# Patient Record
Sex: MALE | Race: WHITE
[De-identification: names, ages, dates, MRNs, and addresses within clinical notes are randomized per-mention and may not be internally consistent; named-entity substitution may affect disease eponyms.]

---

## 2019-02-04 ENCOUNTER — HOSPITAL ENCOUNTER (OUTPATIENT)
Dept: HOSPITAL 41 - JD.ED | Age: 79
Discharge: HOME | End: 2019-02-04
Attending: SURGERY
Payer: MEDICARE

## 2019-02-04 DIAGNOSIS — K29.50: ICD-10-CM

## 2019-02-04 DIAGNOSIS — K29.80: ICD-10-CM

## 2019-02-04 DIAGNOSIS — K20.9: ICD-10-CM

## 2019-02-04 DIAGNOSIS — T18.128A: Primary | ICD-10-CM
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
## 2019-02-04 PROCEDURE — 71045 X-RAY EXAM CHEST 1 VIEW: CPT

## 2019-02-04 PROCEDURE — 43239 EGD BIOPSY SINGLE/MULTIPLE: CPT

## 2019-02-04 PROCEDURE — 96375 TX/PRO/DX INJ NEW DRUG ADDON: CPT

## 2019-02-04 PROCEDURE — 43247 EGD REMOVE FOREIGN BODY: CPT

## 2019-02-04 PROCEDURE — 99285 EMERGENCY DEPT VISIT HI MDM: CPT

## 2019-02-04 PROCEDURE — 96374 THER/PROPH/DIAG INJ IV PUSH: CPT

## 2019-02-04 NOTE — PCM.HP
H&P History of Present Illness





- General


Date of Service: 02/04/19


Admit Problem/Dx: 


 Admission Diagnosis/Problem





Admission Diagnosis/Problem      Foreign body in digestive tract








Source of Information: Patient, Provider


History Limitations: Reports: No Limitations





- History of Present Illness


Initial Comments - Free Text/Narative: 





The patient is a 78-year-old gentleman who presents from an outside facility.  

He reports having Salad and steak earlier today, after which he felt it became 

lodged in his esophagus.  He is having discomfort in the substernal area.  This 

occurs when he attempts to eat or drink anything.  When he tries to drink fluids

, he has regurgitation and coughing.  He was feeling fine before this episode 

occurred.  He has no previous episodes similar to this





- Related Data


Allergies/Adverse Reactions: 


 Allergies











Allergy/AdvReac Type Severity Reaction Status Date / Time


 


No Known Allergies Allergy   Verified 02/04/19 18:42











Home Medications: 


 Home Meds





Aspirin [Adult Low Dose Aspirin EC] 81 mg PO DAILY 02/04/19 [History]











Past Medical History


HEENT History: Reports: Cataract





- Past Surgical History


Other Musculoskeletal Surgeries/Procedures:: archilles tendon repair





Social & Family History





- Family History


Oncologic: Reports: Colon, Leukemia





- Tobacco Use


Smoking Status *Q: Never Smoker





- Caffeine Use


Caffeine Use: Reports: Tea





- Recreational Drug Use


Recreational Drug Use: No





H&P Review of Systems





- Review of Systems:


Review Of Systems: See Below


General: Reports: No Symptoms


HEENT: Reports: No Symptoms


Pulmonary: Reports: No Symptoms


Cardiovascular: Reports: No Symptoms


Gastrointestinal: Reports: Other (see HPI)


Genitourinary: Reports: No Symptoms


Musculoskeletal: Reports: Foot Pain


Skin: Reports: No Symptoms


Neurological: Reports: No Symptoms





Exam





- Exam


Exam: See Below





- Vital Signs


Vital Signs: 


 Last Vital Signs











Temp  36.9 C   02/04/19 18:42


 


Pulse  73   02/04/19 18:42


 


Resp  12   02/04/19 18:42


 


BP  165/87 H  02/04/19 18:42


 


Pulse Ox  100   02/04/19 18:42











Weight: 84.368 kg





- Exam


Quality Assessment: No: Supplemental Oxygen


General: Alert, Oriented


HEENT: Conjunctiva Clear, EOMI


Neck: Supple


Lungs: Normal Respiratory Effort


GI/Abdominal Exam: Soft, Non-Tender, No Distention


Extremities: Normal Inspection


Neurological: Cranial Nerves Intact


Neuro Extensive - Mental Status: Alert, Oriented x3, Normal Mood/Affect





*Q Meaningful Use (ADM)





- VTE Risk Assess *Q


Each Risk Factor Represents 1 Point: Minor Surgery Planned


Total Score 1 Point Risk Factors: 1


Each Risk Factor Represents 3 Points: Age 75 Years or Greater


Total Score 3 Point Risk Factors: 3


Problem List Initiated/Reviewed/Updated: Yes


Orders Last 24hrs: 


 Active Orders 24 hr











 Category Date Time Status


 


 Patient Status [ADT] Routine ADT  02/04/19 20:24 Active


 


 Peripheral IV Care [RC] .AS DIRECTED Care  02/04/19 19:10 Active


 


 Sodium Chloride 0.9% [Saline Flush] Med  02/04/19 19:10 Active





 10 ml FLUSH ASDIRECTED PRN   


 


 Peripheral IV Insertion Adult [OM.PC] Routine Oth  02/04/19 19:10 Ordered


 


 Schedule Procedure [COMM] Stat Oth  02/04/19 20:23 Ordered








 Medication Orders





Sodium Chloride (Saline Flush)  10 ml FLUSH ASDIRECTED PRN


   PRN Reason: Keep Vein Open


   Last Admin: 02/04/19 19:20  Dose: 10 ml








Assessment/Plan Comment:: 





79 y/o gentleman with foreign body in the esophagus


- plan for EGD with intervention to retrieve foreign body. Risk of perforation 

discussed, written consent obtained.


- Based on findings from the procedure, we will decide patient needs further 

workup


- NPO





Alma Nino MD


General Surgery

## 2019-02-04 NOTE — PCM.PRNOTE
- Free Text/Narrative


Note: 





Operative Report





Date of procedure: February 4, 2019





Preoperative diagnosis: Esophageal foreign body 


Postoperative diagnosis: Esophageal foreign body with gastritis and duodenitis





Surgeon: Alma Nino M.D.


Procedure: EGD with biopsy and retrieval foreign body





Anesthesia: General ET





Anesthesiologist: Marlon Corea CRNA 





IV fluids: 1000 mL





Estimated blood loss: 0 mL





Specimens: 


1.  Duodenum


2.  Gastric antrum for H. pylori





Indication: The patient is a 78 -year-old gentleman who presented with symptoms 

of a foreign body in his esophagus after eating earlier today.  The patient was 

consented for an EGD with intervention.  Risk of bleeding and perforation were 

discussed.  The patient's consent was obtained





Description of the procedure:  The patient was taken to the endoscopy suite and 

placed on hemodynamic monitoring.  The nurse anesthetist induced general 

anesthesia and intubated the patient. A bite block was placed. The patient was 

positioned in the left lateral decubitus position. A timeout was performed.





The endoscope was gently placed into the mouth to the back of the pharynx and 

introduced into the esophagus. The scope was gently advanced under direct 

visualization down where we visualized a foreign body.  This was consistent 

with the patient's story that he had ingested piece of meat.  A hexagonal snare 

was used to grasp the median withdrawn.  The mouth.  We then reinserted the 

scope and attempted to push the remaining food bolus into the stomach.  It was 

at this time, we noticed a small mucosal rent.  We then proceeded to grasp the 

second food piece with the hexagonal snare, and it was withdrawn.  This was an 

approximately 4 x 0.5 cm piece of meat.  The scope was then reinserted into the 

stomach.  It was a large amount of fluid in stomach, which was suctioned.  

There was linear gastritis consistent with mild erosion and erythema.  The 

gastric antrum was biopsied using cold biopsy forceps for H. pylori.  The 

duodenum was entered and there was diffuse to adenopathy with erythema and mild 

erosions.  There was no jose david ulceration.  A biopsy was taken of the duodenal 

mucosa also.  Again inspected the stomach and did not note any residual 

abnormality.  The scope was withdrawn and there was esophagitis noted at the GE 

junction.  The area of previous mucosal injury was hemostatic without signs of 

deep injury. A final piece of foreign body was grasped withdrawn.  The scope 

was reinserted one final time to inspect the area.  There was no residual 

foreign body.  There was noted some esophagitis with petechiae in the mid to 

upper esophagus.  





The procedure was terminated. the patient tolerated the procedure well without 

any evidence of complications.





Instructions:


 The patient will be instructed to maintain a full liquid diet for the next 48 

hours.  After that time, he may resume regular diet with adequate chewing.  

Will follow up in my office in 2 weeks





Alma Nino MD


General Surgery

## 2019-02-04 NOTE — PCM.PREANE
Preanesthetic Assessment





- Anesthesia/Transfusion/Family Hx


Anesthesia History: Prior Anesthesia Without Reaction


Family History of Anesthesia Reaction: No


Transfusion History: No Prior Transfusion(s)





- Review of Systems


General: Malaise


Pulmonary: No Symptoms


Cardiovascular: No Symptoms


Gastrointestinal: Abdominal Pain


Neurological: No Symptoms


Other: Reports: Throat Pain





- Physical Assessment


NPO Status Date: 02/04/19


NPO Status Time: 13:00


Pulse: 73


O2 Sat by Pulse Oximetry: 100


Respiratory Rate: 12


Blood Pressure: 165/87


Temperature: 36.9 C


Vital Signs: 





 Last Vital Signs











Temp  36.9 C   02/04/19 18:42


 


Pulse  73   02/04/19 18:42


 


Resp  12   02/04/19 18:42


 


BP  165/87 H  02/04/19 18:42


 


Pulse Ox  100   02/04/19 18:42











Height: 1.73 m


Weight: 84.368 kg


ASA Class: 2


Mental Status: Alert & Oriented x3


Airway Class: Mallampati = 2


Dentition: Reports: Partial (top), Missing Tooth/Teeth


Thyro-Mental Finger Breadths: 3


Mouth Opening Finger Breadths: 2


ROM/Head Extension: Full


Lungs: Clear to Auscultation, Normal Respiratory Effort


Cardiovascular: Regular Rate, Regular Rhythm





- Allergies


Allergies/Adverse Reactions: 


 Allergies











Allergy/AdvReac Type Severity Reaction Status Date / Time


 


No Known Allergies Allergy   Verified 02/04/19 18:42














- Blood


Blood Available: No


Product(s) Available: None





- Anesthesia Plan


Pre-Op Medication Ordered: None





- Acknowledgements


Anesthesia Type Planned: General Anesthesia


Pt an Appropriate Candidate for the Planned Anesthesia: Yes


Alternatives and Risks of Anesthesia Discussed w Pt/Guardian: Yes


Pt/Guardian Understands and Agrees with Anesthesia Plan: Yes





PreAnesthesia Questionnaire


HEENT History: Reports: Cataract





- Past Surgical History


Other Musculoskeletal Surgeries/Procedures:: archilles tendon repair





- SUBSTANCE USE


Smoking Status *Q: Never Smoker


Tobacco Use Within Last Twelve Months: No


Second Hand Smoke Exposure: No


Days Per Week of Alcohol Use: 1


Number of Drinks Per Day: 0


Total Drinks Per Week: 0


Recreational Drug Use History: No





- HOME MEDS


Home Medications: 


 Home Meds





Aspirin [Adult Low Dose Aspirin EC] 81 mg PO DAILY 02/04/19 [History]











- CURRENT (IN HOUSE) MEDS


Current Meds: 





 Current Medications





Sodium Chloride (Saline Flush)  10 ml FLUSH ASDIRECTED PRN


   PRN Reason: Keep Vein Open


   Last Admin: 02/04/19 19:20 Dose:  10 ml





Discontinued Medications





Glucagon (Glucagen)  1 mg IVPUSH ONETIME ONE


   Stop: 02/04/19 19:11


   Last Admin: 02/04/19 19:20 Dose:  1 mg


Lorazepam (Ativan)  1 mg IVPUSH ONETIME ONE


   Stop: 02/04/19 19:11


   Last Admin: 02/04/19 19:20 Dose:  1 mg

## 2019-02-04 NOTE — PCM.OPNOTE
- General Post-Op/Procedure Note


Date of Surgery/Procedure: 02/04/19


Operative Procedure(s): EGD with biopsy and foreign body retrieval


Findings: 





1.  Esophagitis


2.  Gastritis


3.  Duodenopathy


4.  Esophageal foreign body





Pre Op Diagnosis: Esophageal foreign body


Post-Op Diagnosis: Esophageal foreign body with gastritis and duodenopathy


Anesthesia Technique: General ET Tube


Primary Surgeon: Alma Nino


Anesthesia Provider: Marlon Corea


Pathology: 





1. Duodenum


2. Gastric antrum


Fluid Replacement, Intraop: 1,000


Output, Urine Amount: 0


EBL in mLs: 0


Complications: none apparent


Condition: Good

## 2019-02-04 NOTE — EDM.PDOC
ED HPI GENERAL MEDICAL PROBLEM





- General


Chief Complaint: Gastrointestinal Problem


Stated Complaint: SOMETHING INT THROAT


Time Seen by Provider: 02/04/19 18:45


Source of Information: Reports: Patient


History Limitations: Reports: No Limitations





- History of Present Illness


INITIAL COMMENTS - FREE TEXT/NARRATIVE: 





78-year-old male sent from [facility] for evaluation and treatment of 

impacted food bolus. Reportedly the patient had some steak around 1300 today. 

Since then he has been experiencing a sensation that he describes as something 

being stuck in his throat. He has been unable to swallow secretions. Was seen 

at the [facility] and referred to us. Surgeon was made aware. Asked that 

he be seen in the ER for IV Ativan and glucagon.





Upon my inspection the patient is not spitting up any secretions. he states 

that the sensation is improved. He denies any chest pain or trouble breathing 

at this point. Reports since leaving [place] about an hour and a half ago the 

symptoms have improved. 





Patient reports he has never had anything like this before. 





He has never had an EGD before. 


Onset: Today, Sudden





- Related Data


 Allergies











Allergy/AdvReac Type Severity Reaction Status Date / Time


 


No Known Allergies Allergy   Verified 02/04/19 18:42











Home Meds: 


 Home Meds





Aspirin [Adult Low Dose Aspirin EC] 81 mg PO DAILY 02/04/19 [History]











Past Medical History


HEENT History: Reports: Cataract





- Past Surgical History


Other Musculoskeletal Surgeries/Procedures:: archilles tendon repair





Social & Family History





- Tobacco Use


Smoking Status *Q: Never Smoker





- Caffeine Use


Caffeine Use: Reports: Tea





- Recreational Drug Use


Recreational Drug Use: No





ED ROS GENERAL





- Review of Systems


Review Of Systems: See Below


Respiratory: Denies: Shortness of Breath


Cardiovascular: Denies: Chest Pain


GI/Abdominal: Reports: Other (earlier was unable to swallow secretions, now 

improved)





ED EXAM, GI/ABD





- Physical Exam


Exam: See Below


Exam Limited By: No Limitations


General Appearance: Alert, WD/WN, No Apparent Distress


Neck: Normal Inspection


Respiratory/Chest: No Respiratory Distress, Lungs Clear, Normal Breath Sounds


Cardiovascular: Normal Peripheral Pulses, Regular Rate, Rhythm, No Murmur


GI/Abdominal Exam: Soft, Non-Tender


Neurological: Alert, Oriented, Normal Cognition


Psychiatric: Normal Affect, Normal Mood


Skin Exam: Warm, Dry, Normal Color





Course





- Vital Signs


Last Recorded V/S: 


 Last Vital Signs











Temp  99.0 F   02/04/19 22:20


 


Pulse  73   02/04/19 21:56


 


Resp  18   02/04/19 22:20


 


BP  142/89 H  02/04/19 22:20


 


Pulse Ox  94 L  02/04/19 22:20














- Orders/Labs/Meds


Orders: 


 Active Orders 24 hr











 Category Date Time Status


 


 Patient Status [ADT] Routine ADT  02/04/19 20:24 Active


 


 Communication Order [RC] ROUTINE Care  02/04/19 21:55 Active


 


 Cooling Warming Measures [RC] ASDIRECTED Care  02/04/19 21:55 Active


 


 Notify Provider [RC] ASDIRECTED Care  02/04/19 21:55 Active


 


 Oxygen Therapy [RC] ASDIRECTED Care  02/04/19 21:55 Active


 


 Peripheral IV Care [RC] .AS DIRECTED Care  02/04/19 19:10 Active


 


 Pulse Oximetry [RC] ASDIRECTED Care  02/04/19 21:55 Active


 


 Ready for Discharge [RC] PER UNIT ROUTINE Care  02/04/19 21:57 Active


 


 Vital Signs [RC] Q15M Care  02/04/19 21:55 Active


 


 Sodium Chloride 0.9% [Saline Flush] Med  02/04/19 19:10 Active





 10 ml FLUSH ASDIRECTED PRN   


 


 fentaNYL [Sublimaze] Med  02/04/19 21:55 Active





 50 mcg IVPUSH Q5M PRN   


 


 Peripheral IV Insertion Adult [OM.PC] Routine Oth  02/04/19 19:10 Ordered


 


 Schedule Procedure [COMM] Stat Oth  02/04/19 20:23 Ordered








 Medication Orders





Fentanyl (Sublimaze)  50 mcg IVPUSH Q5M PRN


   PRN Reason: Pain


Sodium Chloride (Saline Flush)  10 ml FLUSH ASDIRECTED PRN


   PRN Reason: Keep Vein Open


   Last Admin: 02/04/19 19:20  Dose: 10 ml








Meds: 


Medications











Generic Name Dose Route Start Last Admin





  Trade Name Freq  PRN Reason Stop Dose Admin


 


Fentanyl  50 mcg  02/04/19 21:55  





  Sublimaze  IVPUSH   





  Q5M PRN   





  Pain   





     





     





     


 


Sodium Chloride  10 ml  02/04/19 19:10  02/04/19 19:20





  Saline Flush  FLUSH   10 ml





  ASDIRECTED PRN   Administration





  Keep Vein Open   





     





     





     














Discontinued Medications














Generic Name Dose Route Start Last Admin





  Trade Name Freq  PRN Reason Stop Dose Admin


 


Fentanyl  Confirm  02/04/19 20:42  





  Sublimaze  Administered  02/04/19 20:43  





  Dose   





  100 mcg   





  .ROUTE   





  .STK-MED ONE   





     





     





     





     


 


Glucagon  1 mg  02/04/19 19:10  02/04/19 19:20





  Glucagen  IVPUSH  02/04/19 19:11  1 mg





  ONETIME ONE   Administration





     





     





     





     


 


Lidocaine HCl  Confirm  02/04/19 20:42  





  Xylocaine-Mpf 1%  Administered  02/04/19 20:43  





  Dose   





  4 mls @ as directed   





  .ROUTE   





  .STK-MED ONE   





     





     





     





     


 


Lorazepam  1 mg  02/04/19 19:10  02/04/19 19:20





  Ativan  IVPUSH  02/04/19 19:11  1 mg





  ONETIME ONE   Administration





     





     





     





     


 


Ondansetron HCl  Confirm  02/04/19 20:42  





  Zofran  Administered  02/04/19 20:43  





  Dose   





  4 mg   





  .ROUTE   





  .STK-MED ONE   





     





     





     





     


 


Propofol  Confirm  02/04/19 20:42  





  Diprivan  20 Ml  Administered  02/04/19 20:43  





  Dose   





  200 mg   





  .ROUTE   





  .STK-MED ONE   





     





     





     





     


 


Rocuronium Bromide  Confirm  02/04/19 20:42  





  Zemuron  Administered  02/04/19 20:43  





  Dose   





  50 mg   





  .ROUTE   





  .STK-MED ONE   





     





     





     





     


 


Succinylcholine Chloride  Confirm  02/04/19 20:42  





  Succinylcholine In Ns Pf  Administered  02/04/19 20:43  





  Dose   





  100 mg   





  .ROUTE   





  .STK-MED ONE   





     





     





     





     














- Re-Assessments/Exams


Free Text/Narrative Re-Assessment/Exam: 





02/04/19 20:29


Patient was given some Sprite to Sip on. He states that he can feel that it was 

fizzling in his chest and he began to spit up. 





He was then given an IV and some Ativan and glucagon and encouraged to try the 

Sprite about 20 to 30 minutes later. He could still not swallow Sprite. 





This was discussed with Dr. Pak who was already aware of the case. she'll 

take the OR for an EGD for an impacted food bolus.











Departure





- Departure


Time of Disposition: 20:30


Disposition: DC/Tfer to [facility]


Condition: Fair


Clinical Impression: 


 Food impaction of esophagus








- Discharge Information


*PRESCRIPTION DRUG MONITORING PROGRAM REVIEWED*: No


*COPY OF PRESCRIPTION DRUG MONITORING REPORT IN PATIENT YUMIKO: No





- My Orders


Last 24 Hours: 


My Active Orders





02/04/19 19:10


Peripheral IV Care [RC] .AS DIRECTED 


Sodium Chloride 0.9% [Saline Flush]   10 ml FLUSH ASDIRECTED PRN 


Peripheral IV Insertion Adult [OM.PC] Routine 














- Assessment/Plan


Last 24 Hours: 


My Active Orders





02/04/19 19:10


Peripheral IV Care [RC] .AS DIRECTED 


Sodium Chloride 0.9% [Saline Flush]   10 ml FLUSH ASDIRECTED PRN 


Peripheral IV Insertion Adult [OM.PC] Routine

## 2019-02-04 NOTE — PCM.POSTAN
POST ANESTHESIA ASSESSMENT





- MENTAL STATUS


Mental Status: Alert, Oriented





- VITAL SIGNS


Pulse Rate: 73


SaO2: 90


Resp Rate: 18


Blood Pressure: 144/82


Temperature: 37.3 C





- RESPIRATORY


Respiratory Status: Respiratory Rate WNL, Airway Patent, O2 Saturation Stable, 

Supplemental Oxygen





- CARDIOVASCULAR


CV Status: Pulse Rate WNL, Blood Pressure Stable





- GASTROINTESTINAL


GI Status: No Symptoms





- PAIN


Pain Score: 0





- POST OP HYDRATION


Hydration Status: Adequate & Stable





- OBSERVATIONS


Free Text/Narrative:: 





no anesthesia complications noted

## 2019-02-05 NOTE — CR
Chest: Frontal view of the chest was obtained utilizing portable 

technique.

 

Comparison: No previous chest x-ray.

 

Heart size is enlarged.  Widened upper mediastinum is seen most likely

 due to tortuous great vessels.  Mild tortuosity of the thoracic aorta

 is seen.  Slight parenchymal density is noted within the left 

retrocardiac region.  Lungs otherwise are clear.  Bony structures are 

grossly intact.

 

Impression:

1.  Increased parenchymal density within the left base.  Findings may 

represent atelectasis, chronic scarring as well as change from 

aspiration or pneumonia.  Difficult to exclude small left-sided 

pleural effusion.

2.  Mild cardiomegaly and other incidental findings.

 

Diagnostic code #3

 

I agree with preliminary report from [facility], finalized on 02/05/19, 

12:27 AM Central Time